# Patient Record
Sex: MALE | Race: WHITE | NOT HISPANIC OR LATINO | Employment: OTHER | URBAN - NONMETROPOLITAN AREA
[De-identification: names, ages, dates, MRNs, and addresses within clinical notes are randomized per-mention and may not be internally consistent; named-entity substitution may affect disease eponyms.]

---

## 2024-02-05 ENCOUNTER — OFFICE VISIT (OUTPATIENT)
Dept: NEUROLOGY | Facility: CLINIC | Age: 64
End: 2024-02-05
Payer: MEDICAID

## 2024-02-05 VITALS
WEIGHT: 197 LBS | HEART RATE: 95 BPM | DIASTOLIC BLOOD PRESSURE: 100 MMHG | SYSTOLIC BLOOD PRESSURE: 182 MMHG | OXYGEN SATURATION: 98 % | BODY MASS INDEX: 31.66 KG/M2 | HEIGHT: 66 IN

## 2024-02-05 DIAGNOSIS — G20.A1 PARKINSON'S DISEASE WITHOUT DYSKINESIA OR FLUCTUATING MANIFESTATIONS: Primary | ICD-10-CM

## 2024-02-05 PROCEDURE — 1159F MED LIST DOCD IN RCRD: CPT | Performed by: NURSE PRACTITIONER

## 2024-02-05 PROCEDURE — 99203 OFFICE O/P NEW LOW 30 MIN: CPT | Performed by: NURSE PRACTITIONER

## 2024-02-05 PROCEDURE — 1160F RVW MEDS BY RX/DR IN RCRD: CPT | Performed by: NURSE PRACTITIONER

## 2024-02-05 RX ORDER — LANSOPRAZOLE 30 MG/1
30 CAPSULE, DELAYED RELEASE ORAL DAILY
COMMUNITY
Start: 2024-01-19

## 2024-02-05 NOTE — PROGRESS NOTES
Neurology Consult Note    Referring Provider:   ANKUSH Braswell     Reason for Consultation:    Parkinson's disease     Subjective   History of Present Illness:  Chavo Vergara is a 63 y.o. male who presents today for Parkinson's disease.  He is routinely followed by Jigna Ruvalcaba APRN for primary care.  He is present with his sister today.    Parkinson's disease  Has been previously diagnosed with Parkinson disease and seen by a few different neurologist in the past.  Recently he was seen by Dr. Escobar.  He is currently on Sinemet  mg 1 tablet 3 times daily.  He previously been prescribed 2 tablets 3 times daily but, however, has not been taking it this way.    He continues to report some symptoms of bradykinesia, shuffling of gait, hypophonia, masked face, and stiffness.  However, with the onset of the medication he has been doing much better.  He has not fallen recently.  He is unable to identify if he has any on/off times at this time.    He does have underlying history of anxiety which is readily prevalent and does affect many of his activities.  He is not being treated for this, however.    Allergies:    Patient has no known allergies.    Medications:  Current Outpatient Medications   Medication Sig Dispense Refill    carbidopa-levodopa (SINEMET)  MG per tablet Take 1 tablet by mouth 3 (Three) Times a Day. 90 tablet 5    lansoprazole (PREVACID) 30 MG capsule Take 1 capsule by mouth Daily.       No current facility-administered medications for this visit.     Current outpatient and discharge medications have been reconciled for the patient.  Reviewed by: ANKUSH Juárez    Past Medical History:  Past Medical History:   Diagnosis Date    Pollard's esophagus     GERD (gastroesophageal reflux disease)     Imbalance     Obesity     Parkinson disease      Past Surgical History:   Procedure Laterality Date    COLONOSCOPY      ENDOSCOPY      HERNIA REPAIR      TONSILECTOMY,  "ADENOIDECTOMY, BILATERAL MYRINGOTOMY AND TUBES      VASECTOMY       History reviewed. No pertinent family history.  Social History     Tobacco Use    Smoking status: Former     Types: Cigarettes     Quit date:      Years since quittin.1    Smokeless tobacco: Never   Vaping Use    Vaping Use: Never used   Substance Use Topics    Alcohol use: Yes     Comment: one beer monthly    Drug use: Never     Review of Systems   Musculoskeletal:  Positive for gait problem.   Neurological:  Positive for tremors and weakness.         Objective   Vital Signs:         24  1015   Weight: 89.4 kg (197 lb)     167.6 cm (66\")  Body mass index is 31.8 kg/m².    Physical Exam  Vitals reviewed.   Constitutional:       Appearance: Normal appearance.   HENT:      Head: Normocephalic.      Mouth/Throat:      Pharynx: Oropharynx is clear.   Eyes:      General: Lids are normal.      Extraocular Movements: Extraocular movements intact.      Pupils: Pupils are equal, round, and reactive to light.   Cardiovascular:      Rate and Rhythm: Normal rate and regular rhythm.      Pulses: Normal pulses.   Pulmonary:      Effort: Pulmonary effort is normal.   Musculoskeletal:         General: Normal range of motion.      Cervical back: Normal range of motion and neck supple.   Skin:     General: Skin is warm and dry.      Capillary Refill: Capillary refill takes less than 2 seconds.   Neurological:      Motor: Motor strength is normal.     Deep Tendon Reflexes:      Reflex Scores:       Tricep reflexes are 1+ on the right side and 1+ on the left side.       Bicep reflexes are 1+ on the right side and 1+ on the left side.       Brachioradialis reflexes are 1+ on the right side and 1+ on the left side.       Patellar reflexes are 1+ on the right side and 1+ on the left side.       Achilles reflexes are 1+ on the right side and 1+ on the left side.  Psychiatric:         Mood and Affect: Mood normal.         Speech: Speech normal. " "      Neurological Exam  Mental Status  Awake, alert and oriented to person, place and time. Recent and remote memory are intact. Speech is normal. Language is fluent with no aphasia. Attention and concentration are normal.    Cranial Nerves  CN II: Visual acuity is normal. Visual fields full to confrontation.  CN III, IV, VI: Extraocular movements intact bilaterally. Normal lids and orbits bilaterally. Pupils equal round and reactive to light bilaterally.  CN V: Facial sensation is normal.  CN VII: Full and symmetric facial movement.  CN IX, X: Palate elevates symmetrically. Normal gag reflex.  CN XI: Shoulder shrug strength is normal.  CN XII: Tongue midline without atrophy or fasciculations.    Motor   Strength is 5/5 throughout all four extremities.    Sensory  Sensation is intact to light touch, pinprick, vibration and proprioception in all four extremities.    Reflexes                                            Right                      Left  Brachioradialis                    1+                         1+  Biceps                                 1+                         1+  Triceps                                1+                         1+  Patellar                                1+                         1+  Achilles                                1+                         1+    Coordination  Right: Rapid alternating movement abnormality:Left: Rapid alternating movement abnormality:  Minimal resting tremor noted.    Gait  Casual gait: Wide stance. Reduced stride length. Hesitant gait. Reduced right arm swing. Reduced left arm swing. Able to rise from chair without using arms.      Results Review:    No results found for: \"GLUCOSE\", \"BUN\", \"CREATININE\", \"EGFRIFNONA\", \"EGFRIFAFRI\", \"BCR\", \"K\", \"CO2\", \"CALCIUM\", \"PROTENTOTREF\", \"ALBUMIN\", \"LABIL2\", \"BILIRUBIN\", \"AST\", \"ALT\"  No results found for: \"WBC\", \"HGB\", \"HCT\", \"MCV\", \"PLT\"  No results found for: \"CHOL\", \"CHLPL\", \"TRIG\", \"HDL\", \"LDL\", " "\"LDLDIRECT\"  No results found for: \"TSH\"  No results found for: \"HGBA1C\"  No results found for: \"FOLATE\"  No results found for: \"GEIPMXKH03\"    LABORATORY - SCAN - LAB RESULTS_[facility]_08/17/23 (08/17/2023)     RADIOLOGY - SCAN - CT HEAD WWO_[facility]_2.17.23 (02/17/2023)     Chart Review:  PROGRESS NOTES - SCAN - PROGRESS NOTE_[facility]_08/17/23 (08/17/2023)  PROGRESS NOTES - SCAN - PROGRESS NOTE_[facility]_03/09/23 (03/09/2023)     Plan   Discussion:  Chavo Vergara is a 63 y.o. male who presents for evaluation of Parkinson disease.  He has been previously diagnosed through [facility] neurology and had a couple of neurology switches secondary to providers leaving the practice.  I have reviewed historical records and it does appear that he has a history of abnormal DaTscan to confirm his diagnosis of Parkinson's disease.  Regardless, at this time he does continue with some complaints of bradykinesia's, stiffening, gait abnormalities, masked face, and tremor.  However, on examination these are not overly burdensome.  He continues to take Sinemet  mg 3 times daily and is unsure whether or not he has any on/off times.  His family who is with him also does not really feel like he has any ofttimes.  At this point I feel like we should continue with his current dosing as it appears to be adequately controlling his symptoms.  He had been previously prescribed 2 pills 3 times daily and we may need to increase back to this.  However, for now I would like to keep his dosing the same.  We did discuss fall risk and other safety factors today.  They are understanding.    He does have a significant amount of anxiety which I do feel is causing significant issues to his daily activities.  I have recommended they get with his nurse practitioner that he follows for primary care for further evaluation and treatment of his anxiety.  They states complete understanding and will do " so.    Plan:  Continue Sinemet  mg 1 p.o. 3 times daily  Get with PCP for anxiety  Safety discussed  Call with any questions or concerns    Diagnoses and all orders for this visit:    1. Parkinson's disease without dyskinesia or fluctuating manifestations (Primary)  -     carbidopa-levodopa (SINEMET)  MG per tablet; Take 1 tablet by mouth 3 (Three) Times a Day.  Dispense: 90 tablet; Refill: 5      The patient and I have discussed the plan of care and he is in full agreement at this time.     Follow-Up:  Return in about 2 months (around 4/5/2024) for Parkinson's Disease.         Brian Edwards, ANKUSH  02/06/24  12:02 CST

## 2024-02-08 ENCOUNTER — PATIENT ROUNDING (BHMG ONLY) (OUTPATIENT)
Dept: NEUROLOGY | Facility: CLINIC | Age: 64
End: 2024-02-08
Payer: MEDICAID

## 2024-02-08 ENCOUNTER — TELEPHONE (OUTPATIENT)
Dept: NEUROLOGY | Facility: CLINIC | Age: 64
End: 2024-02-08
Payer: MEDICAID

## 2024-02-08 NOTE — PROGRESS NOTES
February 8, 2024    Hello, may I speak with Chavo Vergara?    My name is ONDINA     I am  with [facility]  [address]  [address]  [place] [address] [phone].    Before we get started may I verify your date of birth? 1960    I am calling to officially welcome you to our practice and ask about your recent visit. Is this a good time to talk?     Tell me about your visit with us. What things went well?         We're always looking for ways to make our patients' experiences even better. Do you have recommendations on ways we may improve?      Overall were you satisfied with your first visit to our practice?        I appreciate you taking the time to speak with me today. Is there anything else I can do for you?       Thank you, and have a great day.      PATIENT DID NOT ANSWER, LEFT VOICEMAIL.

## 2024-04-10 ENCOUNTER — OFFICE VISIT (OUTPATIENT)
Dept: NEUROLOGY | Facility: CLINIC | Age: 64
End: 2024-04-10
Payer: MEDICAID

## 2024-04-10 VITALS
DIASTOLIC BLOOD PRESSURE: 80 MMHG | SYSTOLIC BLOOD PRESSURE: 142 MMHG | HEART RATE: 82 BPM | BODY MASS INDEX: 31.82 KG/M2 | OXYGEN SATURATION: 95 % | WEIGHT: 198 LBS | HEIGHT: 66 IN

## 2024-04-10 DIAGNOSIS — G20.A1 PARKINSON'S DISEASE WITHOUT DYSKINESIA OR FLUCTUATING MANIFESTATIONS: Primary | ICD-10-CM

## 2024-04-10 PROCEDURE — 1160F RVW MEDS BY RX/DR IN RCRD: CPT | Performed by: NURSE PRACTITIONER

## 2024-04-10 PROCEDURE — 99213 OFFICE O/P EST LOW 20 MIN: CPT | Performed by: NURSE PRACTITIONER

## 2024-04-10 PROCEDURE — 1159F MED LIST DOCD IN RCRD: CPT | Performed by: NURSE PRACTITIONER

## 2024-04-10 NOTE — PROGRESS NOTES
Neurology Progress Note    Chief Complaint:  Parkinson's disease     Subjective   History of Present Illness:  Chavo Vergara is a 63 y.o. male who presents today for Parkinson's disease.  He is routinely followed by Jigna Ruvalcaba APRN for primary care.  He is present with his sister today.    Parkinson's disease  He continues to do well without any significant changes.  He denies any resting tremor, bradykinesia, or ambulation difficulties.  He does note some constipation for which he takes senokot for.  He continues Sinemet  mg 3 times daily at this time with no concerns for off time.  He has no questions or concerns.    Allergies:    Patient has no known allergies.    Medications:  Current Outpatient Medications   Medication Sig Dispense Refill    carbidopa-levodopa (SINEMET)  MG per tablet Take 1 tablet by mouth 3 (Three) Times a Day. 90 tablet 5    lansoprazole (PREVACID) 30 MG capsule Take 1 capsule by mouth Daily.       No current facility-administered medications for this visit.     Current outpatient and discharge medications have been reconciled for the patient.  Reviewed by: ANKUSH Juárez    Past Medical History:  Past Medical History:   Diagnosis Date    Pollard's esophagus     GERD (gastroesophageal reflux disease)     Imbalance     Obesity     Parkinson disease      Past Surgical History:   Procedure Laterality Date    COLONOSCOPY      ENDOSCOPY      HERNIA REPAIR      TONSILECTOMY, ADENOIDECTOMY, BILATERAL MYRINGOTOMY AND TUBES      VASECTOMY       History reviewed. No pertinent family history.  Social History     Tobacco Use    Smoking status: Former     Current packs/day: 0.00     Types: Cigarettes     Quit date:      Years since quittin.2    Smokeless tobacco: Never   Vaping Use    Vaping status: Never Used   Substance Use Topics    Alcohol use: Yes     Comment: one beer monthly    Drug use: Never     Review of Systems   Musculoskeletal:  Positive for gait  "problem.   Neurological:  Positive for tremors and weakness.         Objective   Vital Signs:  Heart Rate:  [82] 82  BP: (142)/(80) 142/80      04/10/24  1139   Weight: 89.8 kg (198 lb)     167.6 cm (66\")  Body mass index is 31.96 kg/m².    Physical Exam  Vitals reviewed.   Constitutional:       Appearance: Normal appearance.   HENT:      Head: Normocephalic.      Mouth/Throat:      Pharynx: Oropharynx is clear.   Eyes:      General: Lids are normal.      Extraocular Movements: Extraocular movements intact.      Pupils: Pupils are equal, round, and reactive to light.   Cardiovascular:      Rate and Rhythm: Normal rate and regular rhythm.      Pulses: Normal pulses.   Pulmonary:      Effort: Pulmonary effort is normal.   Musculoskeletal:         General: Normal range of motion.      Cervical back: Normal range of motion and neck supple.   Skin:     General: Skin is warm and dry.      Capillary Refill: Capillary refill takes less than 2 seconds.   Neurological:      Motor: Motor strength is normal.     Deep Tendon Reflexes:      Reflex Scores:       Tricep reflexes are 1+ on the right side and 1+ on the left side.       Bicep reflexes are 1+ on the right side and 1+ on the left side.       Brachioradialis reflexes are 1+ on the right side and 1+ on the left side.       Patellar reflexes are 1+ on the right side and 1+ on the left side.       Achilles reflexes are 1+ on the right side and 1+ on the left side.  Psychiatric:         Mood and Affect: Mood normal.         Speech: Speech normal.       Neurological Exam  Mental Status  Awake, alert and oriented to person, place and time. Recent and remote memory are intact. Speech is normal. Language is fluent with no aphasia. Attention and concentration are normal.    Cranial Nerves  CN II: Visual acuity is normal. Visual fields full to confrontation.  CN III, IV, VI: Extraocular movements intact bilaterally. Normal lids and orbits bilaterally. Pupils equal round and " "reactive to light bilaterally.  CN V: Facial sensation is normal.  CN VII: Full and symmetric facial movement.  CN IX, X: Palate elevates symmetrically. Normal gag reflex.  CN XI: Shoulder shrug strength is normal.  CN XII: Tongue midline without atrophy or fasciculations.    Motor   Strength is 5/5 throughout all four extremities.    Sensory  Sensation is intact to light touch, pinprick, vibration and proprioception in all four extremities.    Reflexes                                            Right                      Left  Brachioradialis                    1+                         1+  Biceps                                 1+                         1+  Triceps                                1+                         1+  Patellar                                1+                         1+  Achilles                                1+                         1+    Coordination  Right: Rapid alternating movement abnormality:Left: Rapid alternating movement abnormality:  Minimal resting tremor noted.    Gait  Casual gait: Wide stance. Reduced stride length. Hesitant gait. Reduced right arm swing. Reduced left arm swing. Able to rise from chair without using arms.      Results Review:    No results found for: \"GLUCOSE\", \"BUN\", \"CREATININE\", \"EGFRIFNONA\", \"EGFRIFAFRI\", \"BCR\", \"K\", \"CO2\", \"CALCIUM\", \"PROTENTOTREF\", \"ALBUMIN\", \"LABIL2\", \"BILIRUBIN\", \"AST\", \"ALT\"  No results found for: \"WBC\", \"HGB\", \"HCT\", \"MCV\", \"PLT\"  No results found for: \"CHOL\", \"CHLPL\", \"TRIG\", \"HDL\", \"LDL\", \"LDLDIRECT\"  No results found for: \"TSH\"  No results found for: \"HGBA1C\"  No results found for: \"FOLATE\"  No results found for: \"OXTSCZWB82\"    LABORATORY - SCAN - LAB RESULTS_[facility]_08/17/23 (08/17/2023)     RADIOLOGY - SCAN - CT HEAD WWO_[facility]_2.17.23 (02/17/2023)     Chart Review:  PROGRESS NOTES - SCAN - PROGRESS NOTE_[facility]_08/17/23 (08/17/2023)  PROGRESS NOTES - SCAN - PROGRESS " NOTE_[facility]_03/09/23 (03/09/2023)     Plan   Diagnoses and all orders for this visit:    1. Parkinson's disease without dyskinesia or fluctuating manifestations (Primary)  Assessment & Plan:  Overall he continues to do well with Sinemet  mg 3 times daily.  On examination he has minimal bradykinesia's, no ambulation difficulties, and no tremor.  He does have very minimal cogwheeling and some masked face but these are unchanged.  He has no on and off times.  Due to his constipation I would continue to recommend use of a stool softener.  I believe at this point he should continue with his current dosing of Sinemet and he should call me with any other questions or concerns.      Follow-Up:  Return in about 6 months (around 10/10/2024) for Parkinson's Disease.         ANKUSH Juárez  04/10/24  12:44 CDT

## 2024-04-10 NOTE — ASSESSMENT & PLAN NOTE
Overall he continues to do well with Sinemet  mg 3 times daily.  On examination he has minimal bradykinesia's, no ambulation difficulties, and no tremor.  He does have very minimal cogwheeling and some masked face but these are unchanged.  He has no on and off times.  Due to his constipation I would continue to recommend use of a stool softener.  I believe at this point he should continue with his current dosing of Sinemet and he should call me with any other questions or concerns.

## 2024-05-24 ENCOUNTER — TELEPHONE (OUTPATIENT)
Dept: NEUROLOGY | Facility: CLINIC | Age: 64
End: 2024-05-24
Payer: MEDICAID

## 2024-05-24 NOTE — TELEPHONE ENCOUNTER
urgent    Medication requested (name and dose):      carbidopa-levodopa (SINEMET)  MG per tablet   Take 1 tablet by mouth 3 (Three) Times a Day.       Pharmacy where request should be sent:      [facility] - [place] [address] [address] - [phone] [facility] [phone] FX     Additional details provided by patient:      Best call back number:  [phone]    Does the patient have less than a 3 day supply:  [x] Yes  [] No    Alonso Jacques Rep  05/24/24, 09:37 CDT

## 2024-05-24 NOTE — TELEPHONE ENCOUNTER
[facility] said the script was filled and picked up on 5-7, insurance will allow it to be filled on 6-6.  Message left requesting a return call.

## 2024-05-29 ENCOUNTER — TELEPHONE (OUTPATIENT)
Dept: NEUROLOGY | Facility: CLINIC | Age: 64
End: 2024-05-29
Payer: MEDICAID

## 2024-05-29 NOTE — TELEPHONE ENCOUNTER
Chavo notified of Brian's message, explained that Brian said he can take it when he wakes up, at 1, and at 5.  He said his old schedule was 8-12-4.  He wanted to know how many he should take.  Explained that he should take 1 tablet 3 times a day and he can use his old schedule if he wants.

## 2024-05-29 NOTE — TELEPHONE ENCOUNTER
Chavo said he got off his schedule for Carbidopa/Levodopa, he wants to know what time he should take it and that you told him when to take it when he was here.  He also said he has nausea for 30 minutes, it starts about 15 minutes after he takes it.  He said his ankles are big at times, suggested he contact his PCP about his ankles.

## 2024-06-18 ENCOUNTER — TELEPHONE (OUTPATIENT)
Dept: NEUROLOGY | Facility: CLINIC | Age: 64
End: 2024-06-18
Payer: MEDICAID

## 2024-06-18 NOTE — TELEPHONE ENCOUNTER
Caller: Chavo Vergara    Relationship: Self    Best call back number: [phone]    What was the call regarding: PT RECEIVED LETTER FROM OFFICE ADVISING THAT ANKUSH BERNARD WILL BE DEPARTING FROM THE CLINIC THIS FALL. PT WOULD LIKE TO KNOW WHO HIS CARE WILL BE TRANSFERRED TO AS HE HAS AN APPT SCHEDULED FOR 10/18/24 AT THIS TIME.    Do you require a callback: YES, PLEASE.    PLEASE REVIEW AND ADVISE.;

## 2024-06-18 NOTE — TELEPHONE ENCOUNTER
CALLED PATIENT TO LET THEM KNOW THAT AS OF RIGHT NOW IT IS NOT DECIDED AS TO WHICH PATIENTS ARE GOING WITH WHICH PROVIDER. I SPOKE WITH ERICA AND LET HER KNOW THAT THERE IS A LIST OF [facility] PATIENTS AND ONCE SOMETHING IS DECIDED EVERYONE WILL GET A CALL. SHE VOICED UNDERSTANDING AND SAID SHE WOULD MAKE PATIENT AWARE.

## 2024-09-27 ENCOUNTER — TELEPHONE (OUTPATIENT)
Dept: CARDIOLOGY | Facility: CLINIC | Age: 64
End: 2024-09-27
Payer: MEDICAID

## 2024-09-27 NOTE — TELEPHONE ENCOUNTER
Caller: JAN    Relationship to patient: Provider    Best call back number: [phone]    Chief complaint: PARKINSON'S    Type of visit: F/U AROUND 10-10-24    Requested date: 10-10-24     If rescheduling, when is the original appointment: 10-18-24 APPT CANCELED BY CLINIC     Additional notes:  CALLING TO GET PATIENT SCHEDULED.  ADVISED I AM SENDING A MESSAGE AND THE CLINIC WILL CALL PATIENT WITH AN APPT.    JAN V/U.    PLEASE CALL& ADVISE

## 2024-09-30 NOTE — TELEPHONE ENCOUNTER
CALLED PATIENT. HAD TO LEAVE A VOICEMAIL. LET THEM KNOW THE SITUATION WITH BEING SHORT HANDED WITH PROVIDERS AND I WOULD SCHEDULE AN APPOINTMENT BUT IT WILL NOT BE UNTIL AFTER THE FIRST OF THE YEAR. I LET THEM KNOW I WOULD MAIL THE APPOINTMENT IN THE MAIL. I DID MAKE APPOINTMENT AND PRINTED OFF REMINDER AND WILL PLACE IN THE MAIL TO BE MAILED OUT TO THEM.

## 2024-10-31 ENCOUNTER — TELEPHONE (OUTPATIENT)
Dept: NEUROSURGERY | Age: 64
End: 2024-10-31

## 2024-10-31 NOTE — TELEPHONE ENCOUNTER
[facility] New Patient Questionnaire    Diagnosis/Reason for Referral?    Abnormal findings on diagnostic imaging of other parts of musculoskeletal system  M54.9 (ICD-10-CM) - Back pain     2. Who is completing questionnaire?      Patient  Caregiver Family      3. Has the patient had any previous spinal/brain surgeries?  NO      A. If yes, what is the name of the facility in which the surgery was performed?       B. Procedure/Surgery performed?       C. Who was the surgeon?       D. When was the surgery?    MM/YY       E. Did the patient improve after the surgery?        4. Is this a second opinion?   If yes, Dr. Ash would like to review patient first before making the appointment.      5. Have MRI Images been obtain within the last year?     Yes  No      XR  CT     If yes, where was the imaging performed?  [facility]   If yes, what part of the body?      Lumbar  Cervical  Thoracic  Brain     If yes, when was it obtained?      10/17/24    Note: if the scan was performed at a facility other than [facility], the disc will need to be brought to the appointment or we need to reach out to obtain the disc.     A. Was the patient instructed to provide the disc?      Yes   No      8. Has the patient had a NCV/EMG within the last year?      Yes  No     If yes, where was it performed and date?      MM/YY  Location:      9. Has the patient been to Physical Therapy?      Yes  No     If yes, what location, how long attended, and last visit?    Location: [facility]       Therapy Lasted:    Date of Last Visit:TWO MONTHS      10. Has the patient been to Pain Management?     Yes  No     If yes, what location and last visit     Location:   Last Visit:   Is it helping?

## 2024-12-12 ENCOUNTER — OFFICE VISIT (OUTPATIENT)
Dept: NEUROSURGERY | Age: 64
End: 2024-12-12
Payer: MEDICAID

## 2024-12-12 VITALS
HEIGHT: 66 IN | DIASTOLIC BLOOD PRESSURE: 106 MMHG | HEART RATE: 102 BPM | WEIGHT: 194 LBS | SYSTOLIC BLOOD PRESSURE: 149 MMHG | BODY MASS INDEX: 31.18 KG/M2

## 2024-12-12 DIAGNOSIS — M51.360 DEGENERATION OF INTERVERTEBRAL DISC OF LUMBAR REGION WITH DISCOGENIC BACK PAIN: ICD-10-CM

## 2024-12-12 DIAGNOSIS — M89.9 LESION OF LUMBAR SPINE: ICD-10-CM

## 2024-12-12 DIAGNOSIS — G89.29 CHRONIC MIDLINE LOW BACK PAIN WITHOUT SCIATICA: ICD-10-CM

## 2024-12-12 DIAGNOSIS — M54.50 CHRONIC MIDLINE LOW BACK PAIN WITHOUT SCIATICA: ICD-10-CM

## 2024-12-12 PROCEDURE — 99204 OFFICE O/P NEW MOD 45 MIN: CPT | Performed by: NURSE PRACTITIONER

## 2024-12-12 RX ORDER — CARBIDOPA AND LEVODOPA 25; 100 MG/1; MG/1
1 TABLET, EXTENDED RELEASE ORAL 3 TIMES DAILY
COMMUNITY
Start: 2024-09-10

## 2024-12-12 RX ORDER — HYDROXYZINE HYDROCHLORIDE 25 MG/1
25 TABLET, FILM COATED ORAL DAILY
COMMUNITY
Start: 2024-09-09

## 2024-12-12 RX ORDER — LANSOPRAZOLE 30 MG/1
1 CAPSULE, DELAYED RELEASE ORAL DAILY
COMMUNITY
Start: 2024-01-19

## 2024-12-12 ASSESSMENT — ENCOUNTER SYMPTOMS
EYES NEGATIVE: 1
BACK PAIN: 1
GASTROINTESTINAL NEGATIVE: 1
RESPIRATORY NEGATIVE: 1

## 2024-12-12 NOTE — PROGRESS NOTES
[facility]  Office Visit      Chief Complaint   Patient presents with    New Patient    Results     MRI review [place]     Back Pain     Patient states that his back pain has improved over the last several days and he denies any pain today. He states that his back pain is intermittent and flares up at random times. Patient states that standing in one place does make his back pain worse. Patient states he will have pain throughout his elbows, shoulders, and bilateral extremities. Patient takes tylenol, ibuprofen for pain relief.     Numbness     Patient complains of numbness in bilateral ankles.       HISTORY OF PRESENT ILLNESS:    Costa Miramontes is a 64 y.o. male with Parkinson's Disease who presents with low back pain that has been present for 10 years. He does not describe a good radicular pattern.  His pain is mostly located in the back.  The patient complains of numbness of the feet.  Walking on concrete makes the pain worse.  Sometimes walking makes the pain better.  Denies any brady urinary incontinence.  States he has anxiety.      His pain is not changed when going from a seated to standing position. His pain is not changed with walking. His pain is not changed when lying flat. Overall, indicative that the patient does not have a mechanical nature to their pain.    He is accompanied with a family member and a friend that provide some of the history.  He is a very poor historian.  States he shuffles when ambulating and sometimes he does not.  Has been attending [employer] for PD.      The patient has underwent a non-operative treatment course that has included:  NSAIDs - ibuprofen   Tylenol  Muscle Relaxers - did not like   Physical Therapy ([facility] but thinks it was for Parkinson's)      Of note he does not use tobacco and does not take blood thinning medications               Past Medical History:   Diagnosis Date    Parkinson disease (HCC)        Past Surgical History:   Procedure Laterality

## 2024-12-30 ENCOUNTER — TELEPHONE (OUTPATIENT)
Dept: NEUROLOGY | Facility: CLINIC | Age: 64
End: 2024-12-30
Payer: MEDICAID

## 2024-12-30 NOTE — TELEPHONE ENCOUNTER
----- Message from Teresa SIMS sent at 12/27/2024  1:28 PM CST -----  Luann (friend) said Chavo had an appointment with his provider in [place] today, they told him to call here.  He said his speech is messed up, he has pain that moves to different locations.  It has been happening for 2 weeks.  She said he was taking a medication for anxiety bu hasn't taken it for 2 or 3 days and he told her he is having thoughts he shouldn't have.  She would like to get his appointment moved up.

## 2024-12-30 NOTE — TELEPHONE ENCOUNTER
RECEIVED MESSAGE FRIDAY FROM [facility]. WAS UNABLE TO TAKE CARE OF IT UNTIL THIS MORNING BUT LOOKS AS IF PATIENT HAS CALLED IN AND APPOINTMENT HAS BEEN MOVED UP.

## 2025-01-09 ENCOUNTER — OFFICE VISIT (OUTPATIENT)
Dept: NEUROLOGY | Facility: CLINIC | Age: 65
End: 2025-01-09
Payer: MEDICAID

## 2025-01-09 VITALS
HEIGHT: 66 IN | OXYGEN SATURATION: 97 % | HEART RATE: 98 BPM | BODY MASS INDEX: 31.97 KG/M2 | SYSTOLIC BLOOD PRESSURE: 142 MMHG | DIASTOLIC BLOOD PRESSURE: 78 MMHG

## 2025-01-09 DIAGNOSIS — G20.A1 PARKINSON'S DISEASE WITHOUT DYSKINESIA OR FLUCTUATING MANIFESTATIONS: Primary | ICD-10-CM

## 2025-01-09 DIAGNOSIS — F48.9 MENTAL HEALTH PROBLEM: ICD-10-CM

## 2025-01-09 RX ORDER — HYDROXYZINE HYDROCHLORIDE 25 MG/1
1 TABLET, FILM COATED ORAL DAILY
COMMUNITY
Start: 2024-09-09

## 2025-01-09 RX ORDER — TIZANIDINE 2 MG/1
TABLET ORAL
COMMUNITY
Start: 2024-10-11

## 2025-01-09 NOTE — PROGRESS NOTES
Neurology Consult Note    [facility]  [address], [address]  [place] [address]  Phone: [phone]  Fax: [phone]    Referring Provider:   No ref. provider found  Primary Care Provider:  Jigna Ruvalcaba APRN    Reason for Consult:  Parkinson's disease  Subjective      Chavo Vergara presents to [facility]    History of Present Illness  64-year-old male seen for follow-up of Parkinson's disease.  Last seen by ANKUSH Blair on 4/10/2024.  Patient is maintained on Sinemet 25 to 200 mg tablet, 1 tablet 3 times daily.  He also reported issues with anxiety.  Was recommended him to contact his primary care to discuss treatment options.    Today patient presents with his sister.  Reports me no worsening of his symptoms.  Patient is a poor historian I did get some history from his sister.  Patient's biggest concern today is anxiety and possible depression.  He notes he used to be very independent and worked and he cannot anymore.  He was tried on hydroxyzine however stopped this after 2 weeks.  Sister reports he has been on several other medicines in the past however patient denies this.  He does live alone.  Denies any memory issues.  No loss of independence.  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  He notes he takes his Sinemet at 8 AM, 2 PM and 9 PM.  Denies any issues sleeping.  Denies any hallucinations.    Patient Active Problem List   Diagnosis    Parkinson's disease without dyskinesia or fluctuating manifestations        Past Medical History:   Diagnosis Date    Pollard's esophagus     GERD (gastroesophageal reflux disease)     Imbalance     Obesity     Parkinson disease         Social History     Socioeconomic History    Marital status: Single   Tobacco Use    Smoking status: Former     Current packs/day: 0.00     Types: Cigarettes     Quit date:      Years since quittin.0    Smokeless tobacco: Never  "  Vaping Use    Vaping status: Never Used   Substance and Sexual Activity    Alcohol use: Not Currently     Comment: one beer monthly    Drug use: Never    Sexual activity: Defer        No Known Allergies       Current Outpatient Medications:     carbidopa-levodopa (SINEMET)  MG per tablet, Take 1 tablet by mouth 3 (Three) Times a Day., Disp: 90 tablet, Rfl: 5    lansoprazole (PREVACID) 30 MG capsule, Take 1 capsule by mouth Daily., Disp: , Rfl:     hydrOXYzine (ATARAX) 25 MG tablet, Take 1 tablet by mouth Daily. (Patient not taking: Reported on 1/9/2025), Disp: , Rfl:     tiZANidine (ZANAFLEX) 2 MG tablet, , Disp: , Rfl:        Objective   Vital Signs:   /78   Pulse 98   Ht 167.6 cm (65.98\")   SpO2 97%   BMI 31.97 kg/m²       Physical Exam  Vitals and nursing note reviewed.   Constitutional:       Appearance: Normal appearance.   HENT:      Head: Normocephalic.   Eyes:      General: Lids are normal.      Extraocular Movements: Extraocular movements intact.      Pupils: Pupils are equal, round, and reactive to light.   Pulmonary:      Effort: Pulmonary effort is normal. No respiratory distress.   Skin:     General: Skin is warm and dry.   Neurological:      Mental Status: He is alert.      Motor: Motor strength is normal.     Deep Tendon Reflexes: Reflexes are normal and symmetric.   Psychiatric:         Attention and Perception: Attention normal. He does not perceive auditory or visual hallucinations.         Mood and Affect: Affect is flat.         Speech: Speech normal.         Thought Content: Thought content does not include homicidal or suicidal ideation.      Comments: Masked facies.  Repetitive questioning.        Neurological Exam  Mental Status  Alert. Oriented to person, place, time and situation. Speech is normal. Speech: Hypophonic and monotone. Language is fluent with no aphasia.    Cranial Nerves  CN II: Visual fields full to confrontation.  CN III, IV, VI: Extraocular movements intact " bilaterally. Normal lids and orbits bilaterally. Pupils equal round and reactive to light bilaterally.  CN V: Facial sensation is normal.  CN VII: Full and symmetric facial movement.  CN IX, X: Palate elevates symmetrically. Normal gag reflex.  CN XI: Shoulder shrug strength is normal.  CN XII: Tongue midline without atrophy or fasciculations.    Motor  Normal muscle bulk throughout. No fasciculations present. Normal muscle tone. The following abnormal movements were seen: Mild cogwheel rigidity of both wrist.  Resting pill-rolling tremor of right hand.  Bradykinesia bilaterally with finger tapping..   Strength is 5/5 throughout all four extremities.    Sensory  Light touch is normal in upper and lower extremities.     Reflexes  Deep tendon reflexes are 2+ and symmetric in all four extremities.    Gait  Casual gait: Normal stance. Reduced stride length. Hesitant and shuffling gait. Reduced right arm swing. Reduced left arm swing.      PHQ-9: Score 5  ALEJANDRINA-7: Score 5      Result Review :   The following data was reviewed by: ANKUSH Hoover on 01/09/2025:       Progress Notes by Brian Edwards APRN (04/10/2024 11:30)   SCANNED NUCLEAR MED (01/11/2023)                Impression:  Chavo Vergara is a 64 y.o. male who presents for follow-up of Parkinson disease.  Patient has previously been diagnosed with Parkinson disease by 2 other providers he also had a positive DaTscan.  He is currently managed with Sinemet 25 to 100 mg tablet, 1 tablet 3 times daily.  Motor symptoms today are very controlled.  Do not appear to have any ofttimes.  In regards to his concerns of anxiety, I do not feel like he gave hydroxyzine a fair shot.  He only stopped this after 2 weeks.  This is being managed his primary care's office.  Recommended patient to restart medication and follow-up with PCP in regards to anxiety complaints.  Additionally sister reports patient's been on several medications in the past.  If hydroxyzine is  ineffective would recommend PCP to consider behavioral health referral.    Diagnoses and all orders for this visit:    1. Parkinson's disease without dyskinesia or fluctuating manifestations (Primary)    2. Mental health problem        Plan:  Continue Sinemet 25 to 20 mg tablet, 1 tablet 3 times daily  Recommend restart hydroxyzine 25 mg tablet, 1 tablet daily per primary care  If no benefit and anxiety treatment, would recommend behavioral health referral.  Defer to PCP for further management.  Follow-up with primary care as scheduled  Follow-up in our clinic 6 months or sooner if needed    The patient and I have discussed the plan of care and he is in full agreement at this time.   I spent a total of 50 minutes on this encounter today.  This includes reviewing prior records, obtaining a thorough HPI, assessment of patient, developing a plan of care with the patient and his sister, patient and sister discussion, patient and sister education as well as documentation.  Greater than 25 minutes was spent face-to-face with the patient.  Follow Up   Return in about 6 months (around 7/9/2025) for Parkinson Disease.            ANKUSH Hoover  01/09/25  15:56 CST

## 2025-01-23 ENCOUNTER — OFFICE VISIT (OUTPATIENT)
Dept: NEUROSURGERY | Age: 65
End: 2025-01-23
Payer: MEDICAID

## 2025-01-23 VITALS
DIASTOLIC BLOOD PRESSURE: 87 MMHG | WEIGHT: 194 LBS | HEART RATE: 97 BPM | HEIGHT: 66 IN | SYSTOLIC BLOOD PRESSURE: 144 MMHG | BODY MASS INDEX: 31.18 KG/M2 | RESPIRATION RATE: 18 BRPM

## 2025-01-23 DIAGNOSIS — R39.15 URINARY URGENCY: ICD-10-CM

## 2025-01-23 DIAGNOSIS — M51.360 DEGENERATION OF INTERVERTEBRAL DISC OF LUMBAR REGION WITH DISCOGENIC BACK PAIN: ICD-10-CM

## 2025-01-23 DIAGNOSIS — M89.9 LESION OF LUMBAR SPINE: Primary | ICD-10-CM

## 2025-01-23 DIAGNOSIS — M54.50 CHRONIC MIDLINE LOW BACK PAIN WITHOUT SCIATICA: ICD-10-CM

## 2025-01-23 DIAGNOSIS — G89.29 CHRONIC MIDLINE LOW BACK PAIN WITHOUT SCIATICA: ICD-10-CM

## 2025-01-23 PROCEDURE — 99213 OFFICE O/P EST LOW 20 MIN: CPT | Performed by: NEUROLOGICAL SURGERY

## 2025-01-23 ASSESSMENT — ENCOUNTER SYMPTOMS
GASTROINTESTINAL NEGATIVE: 1
EYES NEGATIVE: 1
RESPIRATORY NEGATIVE: 1
BACK PAIN: 1

## 2025-01-23 NOTE — PROGRESS NOTES
Review of Systems   Constitutional: Negative.    HENT: Negative.     Eyes: Negative.    Respiratory: Negative.     Cardiovascular: Negative.    Gastrointestinal: Negative.    Genitourinary: Negative.    Musculoskeletal:  Positive for back pain and myalgias.   Skin: Negative.    Neurological:  Positive for tingling, tremors and weakness.   Endo/Heme/Allergies: Negative.    Psychiatric/Behavioral: Negative.        
intervertebral disc of lumbar region with discogenic back pain  M51.360       3. Chronic midline low back pain without sciatica  M54.50     G89.29             PLAN:  -We have discussed and reviewed the results of the repeat MRI lumbar spine with Mr. Miramontes and his friend at length.  We explained that the mass is essentially unchanged when compared to the previous MRI dated 10/17/2024.  We explained that he does have some degenerative changes that can cause the low back pain.  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  This is likely a benign process.  The lesion is intradural and surrounding the nerves making it very risky to biopsy or remove.  He is not really symptomatic from this lesion.  He has no significant lower extremity pains, weakness, numbness, or neurologic deficit. We definitely recommend holding off on surgery.  We discussed having repeating the MRI in about 6 months to see if this changes or we have discussed having him call us if he develops new symptoms.  He would like to keep repeating the films.    -Regarding the urinary frequency, we feel that this is less likely to be stemming from the lumbar spine as he does not appear to have brady incontinence, he should be evaluated by urology if he is concerned.     -Refer to Urology   -Obtain MRI lumbar spine w/wo 6 months  -Follow up 6 months       This dictation was generated by voice recognition computer software.  Although all attempts are made to edit the dictation for accuracy, there may be errors in the transcription that are not intended.      Darien Alcala,

## 2025-02-04 ENCOUNTER — OFFICE VISIT (OUTPATIENT)
Dept: UROLOGY | Age: 65
End: 2025-02-04
Payer: MEDICAID

## 2025-02-04 VITALS — TEMPERATURE: 98.4 F | HEIGHT: 66 IN | WEIGHT: 201.4 LBS | BODY MASS INDEX: 32.37 KG/M2

## 2025-02-04 DIAGNOSIS — N40.1 BENIGN PROSTATIC HYPERPLASIA WITH NOCTURIA: Primary | ICD-10-CM

## 2025-02-04 DIAGNOSIS — R35.1 BENIGN PROSTATIC HYPERPLASIA WITH NOCTURIA: Primary | ICD-10-CM

## 2025-02-04 DIAGNOSIS — R39.15 URINARY URGENCY: ICD-10-CM

## 2025-02-04 DIAGNOSIS — Z76.89 ENCOUNTER TO ESTABLISH CARE: ICD-10-CM

## 2025-02-04 LAB
APPEARANCE FLUID: CLEAR
BILIRUBIN, POC: NORMAL
BLOOD URINE, POC: NORMAL
CLARITY, POC: CLEAR
COLOR, POC: YELLOW
GLUCOSE URINE, POC: NORMAL MG/DL
KETONES, POC: NORMAL MG/DL
LEUKOCYTE EST, POC: NORMAL
NITRITE, POC: NORMAL
PH, POC: 5.5
PROTEIN, POC: NORMAL MG/DL
SPECIFIC GRAVITY, POC: 1.03
UROBILINOGEN, POC: 0.2 MG/DL

## 2025-02-04 PROCEDURE — 81002 URINALYSIS NONAUTO W/O SCOPE: CPT | Performed by: NURSE PRACTITIONER

## 2025-02-04 PROCEDURE — 99205 OFFICE O/P NEW HI 60 MIN: CPT | Performed by: NURSE PRACTITIONER

## 2025-02-04 PROCEDURE — 51798 US URINE CAPACITY MEASURE: CPT | Performed by: NURSE PRACTITIONER

## 2025-02-04 RX ORDER — TAMSULOSIN HYDROCHLORIDE 0.4 MG/1
0.4 CAPSULE ORAL NIGHTLY
Qty: 30 CAPSULE | Refills: 1 | Status: SHIPPED | OUTPATIENT
Start: 2025-02-04

## 2025-02-04 ASSESSMENT — ENCOUNTER SYMPTOMS
ABDOMINAL DISTENTION: 0
VOMITING: 0
BACK PAIN: 0
ABDOMINAL PAIN: 0
NAUSEA: 0

## 2025-02-04 NOTE — PROGRESS NOTES
Psychiatric:         Mood and Affect: Mood normal.         Behavior: Behavior normal.       DATA:    Results for orders placed or performed in visit on 02/04/25   POCT Urinalysis no Micro   Result Value Ref Range    Color, UA Yellow     Clarity, UA Clear     Glucose, UA POC Neg mg/dL    Bilirubin, UA Neg     Ketones, UA Neg mg/dL    Spec Grav, UA 1.030     Blood, UA POC Neg     pH, UA 5.5     Protein, UA POC Neg mg/dL    Urobilinogen, UA 0.2 mg/dL    Leukocytes, UA Neg     Nitrite, UA Neg     Appearance, Fluid Clear Clear, Slightly Cloudy       1. Benign prostatic hyperplasia with nocturia  Worsening lower urinary tract symptoms.  Bladder scan today is 80 mL.  This may be secondary to Parkinson's, BPH or his current antihistamine.  He is currently maintained on hydroxyzine daily for anxiety this could be contributing to lower urinary tract symptoms.  CHRISTOPHER revealing enlarged nonsuspicious prostate.  I would recommend holding hydroxyzine for approximately 5 days to see if his lower urinary tract symptoms improve.  Patient and significant do not feel hydroxyzine has made an improvement in anxiety.  If this is ineffective he can start on Flomax nightly.  Will have her follow-up in about 4 weeks with a bladder scan.  Need to obtain PSA from PCP. Discussed use, benefit, and side effects of prescribed medications. All questions answered. Patient voiced understanding and agreed with treatment plan.     - tamsulosin (FLOMAX) 0.4 MG capsule; Take 1 capsule by mouth at bedtime  Dispense: 30 capsule; Refill: 1  - POCT Urinalysis no Micro    2. Encounter to establish care  Patient is wanting to switch primary care.  Sees an APRN Charity Diop in [place].  Will have him establish care here with a physician. Wants to discuss additional options for anxiety    - [place] MD, Primary Care, [place]    3. Urinary urgency        Orders Placed This Encounter   Procedures    RUPESH,POST-VOID RES,US,NON-IMAGING     PVR -

## 2025-03-05 ENCOUNTER — OFFICE VISIT (OUTPATIENT)
Dept: UROLOGY | Age: 65
End: 2025-03-05
Payer: MEDICAID

## 2025-03-05 VITALS — TEMPERATURE: 97.2 F | WEIGHT: 195 LBS | HEIGHT: 66 IN | BODY MASS INDEX: 31.34 KG/M2

## 2025-03-05 DIAGNOSIS — R39.15 URINARY URGENCY: ICD-10-CM

## 2025-03-05 DIAGNOSIS — N40.1 BENIGN PROSTATIC HYPERPLASIA WITH NOCTURIA: Primary | ICD-10-CM

## 2025-03-05 DIAGNOSIS — R35.1 BENIGN PROSTATIC HYPERPLASIA WITH NOCTURIA: Primary | ICD-10-CM

## 2025-03-05 PROCEDURE — 99213 OFFICE O/P EST LOW 20 MIN: CPT | Performed by: NURSE PRACTITIONER

## 2025-03-05 PROCEDURE — 81002 URINALYSIS NONAUTO W/O SCOPE: CPT | Performed by: NURSE PRACTITIONER

## 2025-03-05 PROCEDURE — 51798 US URINE CAPACITY MEASURE: CPT | Performed by: NURSE PRACTITIONER

## 2025-03-05 RX ORDER — TAMSULOSIN HYDROCHLORIDE 0.4 MG/1
0.4 CAPSULE ORAL NIGHTLY
Qty: 30 CAPSULE | Refills: 11 | Status: SHIPPED | OUTPATIENT
Start: 2025-03-05

## 2025-03-05 ASSESSMENT — ENCOUNTER SYMPTOMS
BACK PAIN: 0
ABDOMINAL PAIN: 0
VOMITING: 0
NAUSEA: 0
ABDOMINAL DISTENTION: 0

## 2025-03-05 NOTE — PROGRESS NOTES
Mood and Affect: Mood normal.         Behavior: Behavior normal.       DATA:    Results for orders placed or performed in visit on 03/05/25   POCT Urinalysis no Micro   Result Value Ref Range    Color, UA yellow     Clarity, UA clear     Glucose, UA POC neg mg/dL    Bilirubin, UA neg     Ketones, UA neg mg/dL    Spec Grav, UA 1.030     Blood, UA POC neg     pH, UA 5.5     Protein, UA POC neg mg/dL    Urobilinogen, UA 0.2 mg/dL    Leukocytes, UA neg     Nitrite, UA neg     Appearance, Fluid Clear Clear, Slightly Cloudy       1. Benign prostatic hyperplasia with nocturia  Symptoms have improved since holding his hydroxyzine and starting Flomax.  Hydroxyzine can play a role with difficulty urinating or emptying the bladder.  Has anticholinergic side effects to it.  Nocturia has improved he is emptying his bladder well with 0 mL PVR.  Would recommend continuing Flomax 0.4 mg daily.  As far as his anxiety needs to follow-up with [place] office, APRN with neurology at [facility].  I will try to obtain his PSA from his PCP.  If she does not have anything within the last year needs to have a new PSA.  Otherwise follow-up in 1 year    - RUPESH,POST-VOID RES,US,NON-IMAGING  - POCT Urinalysis no Micro  - tamsulosin (FLOMAX) 0.4 MG capsule; Take 1 capsule by mouth at bedtime  Dispense: 30 capsule; Refill: 11    2. Urinary urgency    - RUPESH,POST-VOID RES,US,NON-IMAGING    Orders Placed This Encounter   Procedures    RUPESH,POST-VOID RES,US,NON-IMAGING     Bladderscan: 0mL    POCT Urinalysis no Micro        Return in about 1 year (around 3/5/2026).    All information inputted into the note by the MA to include chief complaint, past medical history, past surgical history, medications, allergies, social and family history and review of systems has been reviewed and updated as needed by me.    EMR Dragon/transcription disclaimer: Much of this documentt is electronic  transcription/translation of spoken language to printed text.

## 2025-07-10 ENCOUNTER — OFFICE VISIT (OUTPATIENT)
Dept: NEUROLOGY | Facility: CLINIC | Age: 65
End: 2025-07-10
Payer: MEDICAID

## 2025-07-10 VITALS
SYSTOLIC BLOOD PRESSURE: 132 MMHG | BODY MASS INDEX: 31.82 KG/M2 | WEIGHT: 198 LBS | DIASTOLIC BLOOD PRESSURE: 86 MMHG | HEIGHT: 66 IN | OXYGEN SATURATION: 96 % | HEART RATE: 90 BPM

## 2025-07-10 DIAGNOSIS — R13.10 DYSPHAGIA, UNSPECIFIED TYPE: ICD-10-CM

## 2025-07-10 DIAGNOSIS — G20.A1 PARKINSON'S DISEASE WITHOUT DYSKINESIA OR FLUCTUATING MANIFESTATIONS: Primary | ICD-10-CM

## 2025-07-10 DIAGNOSIS — Z91.148 NONCOMPLIANCE WITH MEDICATION REGIMEN: ICD-10-CM

## 2025-07-10 NOTE — PROGRESS NOTES
Neurology Consult Note     [facility]  [address], [address]  [place] [address]  Phone: [phone]  Fax: [phone]    Referring Provider:   No ref. provider found  Primary Care Provider:  Jigna Ruvalcaba APRN    Reason for Consult:  Parkinson disease  Subjective        History of Present Illness  64-year-old male seen for follow-up of Parkinson disease.  Last seen in my clinic on 2025.  Patient was continued on carbidopa levodopa  mg tablet, 1 tab 3 times a day.  Patient continue with his classic symptoms of Parkinson disease to include bradykinesia, rigidity, resting tremor as well as shuffling gait.  Patient was in denial of having Parkinson disease.  This was previously diagnosed by 2 other practitioners as well as positive DaTscan.    Today patient presents with his sister.  Initially tells me overall he is doing about the same.  When asked about his medications, he is not able to tell me when he takes his medicines.  Extensive discussion was had with the patient however he finally reported he had not been taking his medications for quite some time.  Sister reports he is to have a caregiver that lives with him called Luann.  Reportedly Luann is mostly managing his medications.  Patient tells me Kalyani stopped giving him his medicines due to it making him a zombie.  Patient sisters on the pression that he was under the medication still.    Patient reports dysphagia.  Patient Active Problem List   Diagnosis    Parkinson's disease without dyskinesia or fluctuating manifestations        Past Medical History:   Diagnosis Date    Pollard's esophagus     GERD (gastroesophageal reflux disease)     Imbalance     Obesity     Parkinson disease         Social History     Socioeconomic History    Marital status: Single   Tobacco Use    Smoking status: Former     Current packs/day: 0.00     Types: Cigarettes     Quit date:      Years since quittin.5    Smokeless  "tobacco: Never   Vaping Use    Vaping status: Never Used   Substance and Sexual Activity    Alcohol use: Not Currently     Comment: one beer monthly    Drug use: Never    Sexual activity: Defer        No Known Allergies       Current Outpatient Medications:     carbidopa-levodopa (SINEMET)  MG per tablet, Take 1 tablet by mouth 3 (Three) Times a Day., Disp: 90 tablet, Rfl: 5    lansoprazole (PREVACID) 30 MG capsule, Take 1 capsule by mouth Daily., Disp: , Rfl:     hydrOXYzine (ATARAX) 25 MG tablet, Take 1 tablet by mouth Daily. (Patient not taking: Reported on 7/10/2025), Disp: , Rfl:     tiZANidine (ZANAFLEX) 2 MG tablet, , Disp: , Rfl:        Objective   Vital Signs:   /86   Pulse 90   Ht 167.6 cm (65.98\")   Wt 89.8 kg (198 lb)   SpO2 96%   BMI 31.98 kg/m²       Physical Exam  Vitals and nursing note reviewed.   Constitutional:       Appearance: Normal appearance.   HENT:      Head: Normocephalic.   Eyes:      General: Lids are normal.      Extraocular Movements: Extraocular movements intact.      Pupils: Pupils are equal, round, and reactive to light.   Pulmonary:      Effort: Pulmonary effort is normal. No respiratory distress.   Skin:     General: Skin is warm and dry.   Neurological:      Mental Status: He is alert.      Motor: Motor strength is normal.     Deep Tendon Reflexes: Reflexes are normal and symmetric.   Psychiatric:         Mood and Affect: Affect is flat.         Speech: Speech normal.      Comments: Masked facies        Neurological Exam  Mental Status  Alert. Speech is normal. Speech: Hypophonic. Language is fluent with no aphasia.    Cranial Nerves  CN II: Visual fields full to confrontation.  CN III, IV, VI: Extraocular movements intact bilaterally. Normal lids and orbits bilaterally. Pupils equal round and reactive to light bilaterally.  CN V: Facial sensation is normal.  CN VII: Full and symmetric facial movement.  CN IX, X: Palate elevates symmetrically. Normal gag " "reflex.  CN XI: Shoulder shrug strength is normal.  CN XII: Tongue midline without atrophy or fasciculations.    Motor  Normal muscle bulk throughout. No fasciculations present. Normal muscle tone. The following abnormal movements were seen: Resting tremor of bilateral hands.  Pill-rolling motion noted of right hand.  Bradykinesia bilaterally with finger tapping.  Mild rigidity both wrist..   Strength is 5/5 throughout all four extremities.    Sensory  Light touch is normal in upper and lower extremities.     Reflexes  Deep tendon reflexes are 2+ and symmetric in all four extremities.    Gait  Casual gait: Normal stance. Reduced stride length. Shuffling and festinating gait. Reduced right arm swing. Reduced left arm swing. Unable to rise from chair without using arms.      Result Review :   The following data was reviewed by: ANKUSH Hoover on 07/10/2025:       Office Visit with Hemalatha Card APRN (01/09/2025)                Impression:  Chavo Vergara is a 64 y.o. male who presents for follow-up of Parkinson disease.  Unfortunately patient has been noncompliant with his medication regimen.  The duration of this has been several weeks at least.  I highly encouraged patient to reinitiate his Sinemet at  mg tablet, 1 tablet 3 times daily.  I want to see patient back on his medications to see how he responds.  Extensive discussion was had with the patient regards to the pathophysiology of Parkinson disease and treatment options.  Discussed that carbidopa levodopa is the most tolerated medication for Parkinson disease.  Additionally I feel like his reports of being in a \"zombie\" while on Sinemet is more likely related to Parkinson disease than actual medication effect.    In regards to his complaints of dysphagia, would recommend evaluation by speech therapy.    Diagnoses and all orders for this visit:    1. Parkinson's disease without dyskinesia or fluctuating manifestations (Primary)    2. Dysphagia, " unspecified type  -     Ambulatory Referral to Speech Therapy for Evaluation & Treatment    3. Noncompliance with medication regimen        Plan:  Reinitiate carbidopa levodopa  mg tablet, 1 tablet 3 times daily  Referral to speech therapy for dysphagia  Fall precautions  Follow-up with primary care as scheduled  Follow back up in my clinic 6 to 8 weeks after being compliant with medications for further evaluation    The patient and I have discussed the plan of care and he is in full agreement at this time.     Follow Up   Return in about 8 weeks (around 9/4/2025) for Parkinson Disease.            Hemalatha Card, ANKUSH  07/10/25  14:06 CDT

## 2025-07-23 ENCOUNTER — HOSPITAL ENCOUNTER (OUTPATIENT)
Dept: MRI IMAGING | Age: 65
Discharge: HOME | End: 2025-07-23
Payer: MEDICAID

## 2025-07-23 DIAGNOSIS — G89.29 CHRONIC MIDLINE LOW BACK PAIN WITHOUT SCIATICA: ICD-10-CM

## 2025-07-23 DIAGNOSIS — M89.9 LESION OF LUMBAR SPINE: ICD-10-CM

## 2025-07-23 DIAGNOSIS — M51.360 DEGENERATION OF INTERVERTEBRAL DISC OF LUMBAR REGION WITH DISCOGENIC BACK PAIN: ICD-10-CM

## 2025-07-23 DIAGNOSIS — M54.50 CHRONIC MIDLINE LOW BACK PAIN WITHOUT SCIATICA: ICD-10-CM

## 2025-07-23 PROCEDURE — 6360000004 HC RX CONTRAST MEDICATION: Performed by: NEUROLOGICAL SURGERY

## 2025-07-23 PROCEDURE — A9577 INJ MULTIHANCE: HCPCS | Performed by: NEUROLOGICAL SURGERY

## 2025-07-23 PROCEDURE — 72158 MRI LUMBAR SPINE W/O & W/DYE: CPT

## 2025-07-23 RX ADMIN — GADOBENATE DIMEGLUMINE 18 ML: 529 INJECTION, SOLUTION INTRAVENOUS at 11:03

## 2025-07-29 ENCOUNTER — OFFICE VISIT (OUTPATIENT)
Dept: NEUROSURGERY | Age: 65
End: 2025-07-29
Payer: MEDICAID

## 2025-07-29 VITALS
SYSTOLIC BLOOD PRESSURE: 138 MMHG | DIASTOLIC BLOOD PRESSURE: 91 MMHG | HEART RATE: 93 BPM | BODY MASS INDEX: 30.53 KG/M2 | WEIGHT: 190 LBS | HEIGHT: 66 IN

## 2025-07-29 DIAGNOSIS — G89.29 CHRONIC MIDLINE LOW BACK PAIN WITHOUT SCIATICA: ICD-10-CM

## 2025-07-29 DIAGNOSIS — M89.9 LESION OF LUMBAR SPINE: Primary | ICD-10-CM

## 2025-07-29 DIAGNOSIS — M51.360 DEGENERATION OF INTERVERTEBRAL DISC OF LUMBAR REGION WITH DISCOGENIC BACK PAIN: ICD-10-CM

## 2025-07-29 DIAGNOSIS — M54.50 CHRONIC MIDLINE LOW BACK PAIN WITHOUT SCIATICA: ICD-10-CM

## 2025-07-29 PROCEDURE — 99213 OFFICE O/P EST LOW 20 MIN: CPT | Performed by: NURSE PRACTITIONER

## 2025-07-29 ASSESSMENT — ENCOUNTER SYMPTOMS
EYES NEGATIVE: 1
GASTROINTESTINAL NEGATIVE: 1
RESPIRATORY NEGATIVE: 1
BACK PAIN: 1

## 2025-07-29 NOTE — PROGRESS NOTES
Review of Systems   Constitutional: Negative.    HENT: Negative.     Eyes: Negative.    Respiratory: Negative.     Cardiovascular: Negative.    Gastrointestinal: Negative.    Genitourinary: Negative.    Musculoskeletal:  Positive for back pain, falls, joint pain and myalgias.   Skin: Negative.    Neurological:  Positive for tingling and weakness.   Endo/Heme/Allergies: Negative.    Psychiatric/Behavioral: Negative.         
contrast intravenously.     COMPARISON:  None.     FINDINGS:     There is normal bone marrow signal.  The vertebral body heights are maintained.     The lumbar lordosis is maintained, without significant spondylolisthesis.     The conus medullaris terminates at L3-L4 (low-lying). 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  The cauda equina is normal in signal and morphology.     There is no epidural fluid collection/hematoma.     The prevertebral and paraspinal soft tissues are unremarkable.     The visualized portions of the abdominopelvic viscera are grossly unremarkable.     There is no abnormal enhancement.     There is bilateral facet hypertrophy at T11-T12.     T12-L1: There is no significant disc herniation, spinal canal stenosis, or neuroforaminal stenosis.     L1-L2: There is no significant disc herniation, spinal canal stenosis, or neuroforaminal stenosis.     L2-L3: There is mild bilateral facet hypertrophy.  There is no significant disc herniation, spinal canal stenosis, or neuroforaminal stenosis.     L3-L4: There is mild bilateral facet hypertrophy.  There is no significant disc herniation, spinal canal stenosis, or neuroforaminal stenosis.     L4-L5: There is a small broad-based posterior disc bulge with superimposed central disc protrusion.  There is mild bilateral ligamentum flavum and facet hypertrophy.  There is mild spinal canal stenosis.  There is moderate bilateral neural foraminal   stenosis.     L5-S1: There is bilateral facet hypertrophy, with moderate bilateral neural foraminal stenosis.  There is no significant spinal canal stenosis.     IMPRESSION:  1. Low-lying conus.  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  2. Multilevel degenerative changes as above, with moderate bilateral neural foraminal stenosis at L4-L5, and L5-S1 and mild spinal canal stenosis at L4-L5.        ______________________________________   Electronically signed by: ABUNDIO